# Patient Record
Sex: FEMALE | Race: BLACK OR AFRICAN AMERICAN | NOT HISPANIC OR LATINO | URBAN - NONMETROPOLITAN AREA
[De-identification: names, ages, dates, MRNs, and addresses within clinical notes are randomized per-mention and may not be internally consistent; named-entity substitution may affect disease eponyms.]

---

## 2017-12-28 NOTE — PROCEDURE NOTE: CLINICAL
PROCEDURE NOTE: Punctal Plugs, Silicone Anesthesia: Topical. Prep: Antibiotic Drops q 5min x 3. Prior to treatment, the risks/benefits/alternatives were discussed. The patient wished to proceed with procedure. Permanent silicone plugs were inserted. Patient tolerated procedure well. There were no complications. Post procedure instructions given. Oasis softplug 0.8 mm.

## 2018-04-04 NOTE — PATIENT DISCUSSION
Discussed importance of compliance with ocular meds and follow up exams to prevent loss of vision. PULMONARY Medicine Consult HISTORY AND PHYSICAL    aTTENDING Physician    Conor Bustos, *  Reason for consultation is sleep apnea    HISTORY OF PRESENT ILLNESS    Gissel Samayoa is a 53 year old female who is being evaluated for bariatric surgery. Sleep evaluation is recommended based on her body habitus. The patient has no with partner she lives by herself.    The other observers have reported snoring, no: daytime sleepiness, yes ; apneas, no; choking or gasping respirations, yes . Patient reports morning dry throat for the past > 1 year.     Patient reports falling asleep while:  sitting and reading - 3   sitting and talking to someone - 0  sitting quietly after lunch, no alcohol - 3  in a car when stopped in traffic for a few minutes - 0  watching TV - 3   as a passenger in a car - 0  lying down to rest in the afternoon - 3  at a Ready, play, or Di[employer] service - 2  Patient goes to bed 6-7 pm and up 3 am  Depression no, anxiety yes.  Caffeine seldom. ETOH no  Trouble memory yes. Concentration yes.  She falls asleep in no time. She is up night 3-4 times to BR or ??  Famh x EDUARDO no.  Restless legs no.  Heart burn yes no meds  Sinus congestion no.      Current Outpatient Prescriptions   Medication Sig Dispense Refill   • warfarin (COUMADIN) 5 MG tablet Take, by mouth, one to two tablets daily as directed.  BLOOD THINNER 60 tablet 4   • lidocaine (XYLOCAINE) 5 % ointment Apply topically as needed for Pain. 35.44 g 2   • warfarin (COUMADIN) 1 MG tablet TAKE 2-3 TABLETS BY MOUTH DAILY 270 tablet 2   • warfarin (COUMADIN) 5 MG tablet TAKE 1 TABLET BY MOUTH DAILY OR AS DIRECTED 90 tablet 1   • cyanocobalamin 1000 MCG tablet Take 1 tablet by mouth daily. For low B12 level 30 tablet 11     No current facility-administered medications for this visit.          Current Outpatient Prescriptions   Medication Sig   • warfarin (COUMADIN) 5 MG tablet Take, by mouth, one to two tablets daily as directed.  BLOOD THINNER   •  lidocaine (XYLOCAINE) 5 % ointment Apply topically as needed for Pain.   • warfarin (COUMADIN) 1 MG tablet TAKE 2-3 TABLETS BY MOUTH DAILY   • warfarin (COUMADIN) 5 MG tablet TAKE 1 TABLET BY MOUTH DAILY OR AS DIRECTED   • cyanocobalamin 1000 MCG tablet Take 1 tablet by mouth daily. For low B12 level     No current facility-administered medications for this visit.          Past Medical History:   Diagnosis Date   • Allergic rhinitis, cause unspecified    • Blood clot associated with vein wall inflammation    • Degenerative skin disorder     necrobiosis lipoidica diabet   • Diaphragmatic hernia without mention of obstruction or gangrene    • DJD (degenerative joint disease)     bilateral hips; lumbar spine   • Esophageal reflux    • Inferior mesenteric vein thrombosis (CMS/HCC) 2014   • Liver disorder     liver mass/lesions, hemangiomas   • Mild intermittent asthma without complication 2016   • Morbid obesity with BMI of 50.0-59.9, adult (CMS/HCC) 2016   • PONV (postoperative nausea and vomiting)           Past Surgical History:   Procedure Laterality Date   • BIOPSY OF BREAST, INCISIONAL  age 15 & 20y    benign tumors removed from both breasts   •  DELIVERY+POSTPARTUM CARE       x3, , ,    • COLONOSCOPY DIAGNOSTIC  2015    Diverticulosis - Dr Betts   • ESOPHAGOGASTRODUODENOSCOPY TRANSORAL FLEX W/BX SINGLE OR MULT  2005    DR Betts   • ESOPHAGOGASTRODUODENOSCOPY TRANSORAL FLEX W/BX SINGLE OR MULT  6/3/2015    [facility]   • EXCISE BREAST LES W XRAY MARKER  05    Dr. Morrissey left breast: benign breast with fibroadenoma x 2.   • EXCISE BREAST LES W XRAY MARKER  3/21/07    Ghanshyam Right breast - benign   • LAPAROSCOPY,TUBAL CAUTERY      Tubal Ligation   • REMOVAL OF TONSILS,12+ Y/O  1982   • TOTAL ABDOM HYSTERECTOMY  05    Uterine fibroids; partial 2003         Family History   Problem Relation Age of Onset   • Thyroid Daughter      Graves   • Diabetes  Mother    • Hypertension Mother      overweight   • Diabetes Father      doesn't know father well   • Genetic Sister      congenital heart defect   • OTHER Sister      B12 deficiency   • Diabetes Sister    • Asthma Sister    • OTHER Sister      B12 deficiency   • NEGATIVE FAMILY HX OF Other      No breast, ovarian, colon, or uterine cancer   • Diabetes Other      5 aunts   • OTHER Brother      B12 Deficiency   • Diabetes Brother    • OTHER Brother      B12 Deficiency   • Cancer Maternal Uncle 65     colon          Social History     Social History   • Marital status:      Spouse name: N/A   • Number of children: 3   • Years of education: N/A     Occupational History   • 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, sister, and a client     Social History Main Topics   • Smoking status: Never Smoker   • Smokeless tobacco: Never Used   • Alcohol use No   • Drug use: No   • Sexual activity: Not Currently     Other Topics Concern   •  Service No   • Blood Transfusions No   • Caffeine Concern No   • Occupational Exposure No   • Hobby Hazards No   • Sleep Concern No   • Stress Concern No   • Weight Concern Yes     wants to lose some weight   • Special Diet No   • Back Care No   • Exercise Yes     gym, cardio, weights   • Seat Belt Yes     wears seat belts   • Self-Exams Yes     doesn't do her breast exams consistently     Social History Narrative   • None          ALLERGIES:   Allergen Reactions   • Latex      rash, itching, tongue swelling         REVIEW OF SYSTEMS    Constitutional:  Denies fevers, chills, weakness, fatigue, loss of appetite, abnormal weight gain or abnormal weight loss.    Eyes:  Denies blindness, blurred vision, double vision, pain, itching, or burning.    HENT:  Denies facial pain, ear pain, hearing loss, tinnitus, nasal congestion, rhinorrhea, epistaxis, sinus pain, mouth lesions or sore throat.    Respiratory:  Denies SOB (shortness of breath), cough, sputum production, hemoptysis or wheezing.     Cardiovascular:  Denies chest pains, palpitations, tachycardia, edema, cyanosis, or vertigo.    Gastrointestinal:  Denies abdominal pain, heartburn, nausea, vomiting, diarrhea, constipation or blood in stool.    Musculoskeletal:  Denies neck pain, back pain, joint pain, joint swelling or tenderness, muscle pains or spasms. Denies neck pain, stiffness or swelling.    Neurologic:  Denies numbness, tingling, other sensory changes, sudden weakness in arms or legs. Denies confusion, headache, dizziness, memory loss or tremors.    Genitourinary:  Denies urinary frequency, nocturia, urgency, incontinence, dysuria or hematuria.    Hematologic/Lymphatic:  Denies easy bruising or bleeding or swollen lymph glands.    Endocrine:  Denies heat or cold intolerance, polydipsia or polyuria. Denies changes in hair or skin texture.    Integument:  Denies new rashes or lesions, pruritus or dryness of skin.    Psychiatric:  Denies anxiety, depression, hallucinations, irritability or sleeping problems.    Allergic/Immunologic:  Denies recurrent infections or hypersensitivity.     All other Review of Systems negative.      ================================================================    PHYSICAL EXAM     Vital Last Value 24 Hour Range   Temperature   @FLOWSTAT(6:24::1)@   Pulse 88 (08/01/17 0933) @FLOWSTAT(8:24::1)@   Respiratory   @FLOWSTAT(9:24::1)@   Blood Pressure 126/80 (08/01/17 0933) @FLOWSTAT(5:24::1)@   Pulse Oximetry 97 % (08/01/17 0933) @FLOWSTAT(10:24::1)@   Estimated body mass index is 57.86 kg/m² as calculated from the following:    Height as of 7/20/17: 5' 6\" (1.676 m).    Weight as of this encounter: 162.6 kg.   FACE and NECK: with moderate excessive soft tissue.   MOUTH: Dental occlusion normal. Tongue is enlarged for oral cavity with posterior mounding and appears markedly obstructive.    Palate is enlarged without tonsillar hypertrophy. The palatal margin is not visible behind the tongue base and appears  obstructive. NOSE: The patient breathes through her nose with alar collapse. NECK: circumference is 43 cm.    General: Alert and oriented x3, not in distress  HEENT/Neck: No pallor, no icterus, no injection, PERRLA (pupils equal, round, and reactive to light and accommodation), EOMI (extraocular movements are intact), no JVD (jugular venous distention), no palpable lymph nodes, No thyromegaly. Mucous membranes moist.  Heart: Normal S1, S2, RRR (regular rate and rhythm), no gallop, murmur, or rub  Lungs: CTA  Abdomen: Not distended, bowel sounds positive, no palpable masses, no hepatosplenomegaly  Extremities: No lower extremity swelling or tenderness, no erythema  Neurologic: No focal neurological deficit  Skin: No rashes      Assessment/plan:    ASSESSMENT: (G47.19) Daytime hypersomnolence  (primary encounter diagnosis)  Comment: History and exam are strongly suggestive of obstructive sleep apnea. Split-night sleep study is ordered. We discussed what to expect in the sleep lab.  Plan: POLYSOMNOGRAPHY 4 OR MORE PARAMETERS          (J45.20) Mild intermittent asthma without complication  Comment: Continue albuterol as needed.    (I80.9) Blood clot associated with vein wall inflammation  Comment: This is of the inferior mesenteric vein.    (Z79.01) Long term (current) use of anticoagulants    (E66.01,  Z68.43) Morbid obesity with BMI of 50.0-59.9, adult (CMS/[facility])  Comment: Being evaluated for bariatric surgery.    I will call the patient after sleep study with further recommendations    Thank you very much for this consultation      Kia Gallardo MD  Pulmonary & Critical Care Medicine  Pager: [phone]

## 2018-04-24 NOTE — PROCEDURE NOTE: CLINICAL
PROCEDURE NOTE: Punctal Plugs, Silicone #1 Right Lower Lid. Diagnosis: FARZAD. Prior to treatment, the risks/benefits/alternatives were discussed. The patient wished to proceed with procedure. Permanent silicone plugs were inserted. Size/location of plugs inserted: 0.8mm. Patient tolerated procedure well. There were no complications. Post procedure instructions given. Ashley Saldaña

## 2018-05-24 NOTE — PROCEDURE NOTE: SURGICAL
Prior to commencing surgery patient identification, surgical procedure, site, and side were confirmed by Dr. Kaylee Salcedo. Following topical proparacaine anesthesia, the patient was positioned at the YAG laser, a contact lens coupled to the cornea with methylcellulose and an axial posterior capsulotomy performed without complication using 2.6 Mj x 26. Excess methylcellulose was washed from the eye, one drop of Alphagan was instilled and the patient returned to the holding area having tolerated the procedure well and without complication. <br />[facility]:[id number]

## 2018-05-31 NOTE — PROCEDURE NOTE: SURGICAL
Prior to commencing surgery patient identification, surgical procedure, site, and side were confirmed by Dr. Aftab Vick. Following topical proparacaine anesthesia, the patient was positioned at the YAG laser, a contact lens coupled to the cornea with methylcellulose and an axial posterior capsulotomy performed without complication using 3.2 Mj x 15. Excess methylcellulose was washed from the eye, one drop of Alphagan was instilled and the patient returned to the holding area having tolerated the procedure well and without complication. <br />[facility]:[id number]

## 2018-06-07 NOTE — PROCEDURE NOTE: CLINICAL
PROCEDURE NOTE: Punctal Plugs, Joelle Maldonado ([id number], [id number]) #2 OU. Diagnosis: FARZAD. Inserted Quintess Dissolvable PP BLL:  0.4 mm x 2 BLL. No complications. Wanda Linton

## 2019-01-07 NOTE — PATIENT DISCUSSION
Patient informed todays visit will be billed to medical insurance due to inserting punctal plugs.   She was instructed to rtc for comp to use her vision plan.

## 2019-01-07 NOTE — PATIENT DISCUSSION
Patient elects Charly Villalba PP BLL today.  No complications OD.  Trace bleeding OS LL.  Rx polytrim qid os for 1 week.

## 2019-01-07 NOTE — PROCEDURE NOTE: CLINICAL
PROCEDURE NOTE: Punctal Plugs, Lorri Gotti ([id number], [id number]) #2 OU. Diagnosis: Keratoconjunctivitis Sicca, Not Specified As Sjögren's. Inserted Samanthatess PP BLL. No complications OD. Slight residual blood OS. Rx polytrim qid os for 1 week. Julio C Murrieta

## 2019-02-19 ENCOUNTER — IMPORTED ENCOUNTER (OUTPATIENT)
Dept: URBAN - NONMETROPOLITAN AREA CLINIC 1 | Facility: CLINIC | Age: 54
End: 2019-02-19

## 2019-02-19 PROBLEM — H53.121: Noted: 2019-02-19

## 2019-02-19 PROCEDURE — 99202 OFFICE O/P NEW SF 15 MIN: CPT

## 2019-02-19 NOTE — PATIENT DISCUSSION
TRANSIENT VISION LOSS ODDUE TO UNCONTROLLED BSEDUCATE PTREASSURED PT THAT VISION WILL RETURN WITH BS CONTROLRTC PRN; 's Notes: Please dilate with 1% only if Dilation is indicated.  Pt stays dilated for DAYS

## 2019-04-30 NOTE — PROCEDURE NOTE: CLINICAL
PROCEDURE NOTE: Punctal Plugs, Silicone OD. Diagnosis: Keratoconjunctivitis Sicca, Not Specified As Sjögren's. Prior to treatment, the risks/benefits/alternatives were discussed. The patient wished to proceed with procedure. Permanent silicone plugs were inserted. Size/location of plugs inserted: RLL 1.0MM. Patient tolerated procedure well. There were no complications. Post procedure instructions given. Ben Chang

## 2019-05-02 NOTE — PATIENT DISCUSSION
5/2/19.  Plug RLL removed by technician in office, immediately followed by release of mucoid discharge.  start Azasite BID OD until gone and also Rx Polytrim QID OD for one week (pt leave tomorrow for [place] for 2 weeks).   I suspect may have older silicone plug intracan RLL and may need canaliculotomy RLL with [facility] if a recurrent issue.

## 2019-06-03 NOTE — PATIENT DISCUSSION
SUBJECTIVE:     CC: Charity Hudson is an 46 Discussed lid hygiene, warm compress and eyelid wash. Provider  Allergies  Meds  Problems        Past Medical History   Diagnosis Date     NO ACTIVE PROBLEMS       Past Surgical History   Procedure Laterality Date      section  2000     1st pregnancy FTP, 2nd pregnancy - Rosy Ayala MD  [facility]

## 2019-06-03 NOTE — PROCEDURE NOTE: CLINICAL
PROCEDURE NOTE: Punctal Plugs, Joelle Maldonado ([id number], [id number]) OU. Diagnosis: Keratoconjunctivitis Sicca, Not Specified As Sjögren's. Inserted Quintess Dissolvable PP BLL 0.5 mm. No complications. Wanda Linton

## 2021-01-19 NOTE — PATIENT DISCUSSION
refer to [facility] for astigmatic SCL OD trial, possible LVC if appreciates SCL-if not JTM will eval ERM again at next visit.  Pt to wear scl to [facility] visit for bcva check.

## 2021-01-19 NOTE — PATIENT DISCUSSION
ALL DISCUSSION PER LAST VISIT WITH [facility]-- for refraction after JTM consult--SEE #1.

## 2021-01-19 NOTE — PATIENT DISCUSSION
Before rec surgical intervention would rec pt try astigmatic contact lens to see if change is more refractive with possible referral for LVC.  Pt to come into next visit with JTM wearing trial astigmatic SCL.

## 2021-01-19 NOTE — PATIENT DISCUSSION
ALL DISCUSSION PER LAST VISIT WITH [facility]--Patient educated on decrease reliability of SO and increase probability of LVC enh.

## 2021-02-15 NOTE — PATIENT DISCUSSION
ALL DISCUSSION PER LAST VISIT WITH [facility]-- for refraction after JTM consult--SEE #1.

## 2021-02-15 NOTE — PATIENT DISCUSSION
ALL DISCUSSION PER LAST VISIT WITH [facility]--Recommended warm compresses, lid scrubs.

## 2021-02-25 NOTE — PATIENT DISCUSSION
ALL DISCUSSION PER LAST VISIT WITH [facility]--Recommended warm compresses, lid scrubs.

## 2021-02-25 NOTE — PATIENT DISCUSSION
ALL DISCUSSION PER LAST VISIT WITH [facility]-- for refraction after JTM Consult. Yes

## 2021-02-25 NOTE — PATIENT DISCUSSION
Patient does not want to wear and deal with contact lenses even though vision improves with contact. Patient to return contacts for refund.

## 2021-03-02 NOTE — PATIENT DISCUSSION
The patient has mild cornea guttata, or early Fuch's endothelial dystrophy. Specular microscopy documented the cell count and morphology. The possibility that the condition could progress was explained. At this early stage observation and serial specular microscopy are indicated. specs.

## 2021-03-02 NOTE — PATIENT DISCUSSION
"Explained refractive LVC sx. Very small Rx for LVC with minimum improvement. Rec get ""sports "" DV gls for driving  and play sports for better VA and sun protection. Gls RX given. "

## 2021-03-02 NOTE — PATIENT DISCUSSION
ALL DISCUSSION PER LAST VISIT WITH [facility]--Recommended warm compresses, lid scrubs.

## 2021-03-30 NOTE — PATIENT DISCUSSION
ALL DISCUSSION PER LAST VISIT WITH [facility]-- for refraction after JTM consult--SEE #1.

## 2021-03-30 NOTE — PATIENT DISCUSSION
Recommended against surgery at this time given that patient is happy with present vision. Also same as #1.

## 2021-03-30 NOTE — PATIENT DISCUSSION
ALL DISCUSSION PER LAST VISIT WITH [facility]--Recommended warm compresses, lid scrubs.

## 2021-03-30 NOTE — PATIENT DISCUSSION
ALL DISCUSSION PER LAST VISIT WITH [facility]-- for refraction after JTM Consult.

## 2021-03-30 NOTE — PATIENT DISCUSSION
Membrane is not visually significant. Vision improved with correction to 20/30. Risks out weigh benefits at this time. This is not a results of cataract surgery. 80 % usually remain stable and never require surgery. Vision is too good to consider surgery at this time. Dry AMD will also limit BCVA. Did offer pt a second opinion, pt declines. Will follow up in 1 year. If worsens and vision worsens can then consider intervention.

## 2021-04-22 NOTE — PATIENT DISCUSSION
ALL DISCUSSION PER LAST VISIT WITH [facility]--Recommended warm compresses, lid scrubs.

## 2021-04-22 NOTE — PATIENT DISCUSSION
ALL DISCUSSION PER LAST VISIT WITH [facility]-- for refraction after JTM Consult.

## 2021-05-22 NOTE — PATIENT DISCUSSION
Retinal tear and detachment warning symptoms reviewed and patient instructed to call immediately if increasing floaters, flashes, or decreasing peripheral vision. MSK pain/Other specify

## 2022-04-10 ASSESSMENT — TONOMETRY
OS_IOP_MMHG: 20
OD_IOP_MMHG: 20

## 2022-04-10 ASSESSMENT — VISUAL ACUITY: OD_SC: 20/400

## 2022-04-12 NOTE — PATIENT DISCUSSION
The patient has mild cornea guttata, or early Fuch's endothelial dystrophy. Specular microscopy documented the cell count and morphology. The possibility that the condition could progress was explained. At this early stage observation and serial specular microscopy are indicated. specs. No

## 2022-10-05 NOTE — PROCEDURE NOTE: CLINICAL
PROCEDURE NOTE: Punctal Plugs, Corneliusrenu  ([id number], [id number]) OU. Diagnosis: Keratoconjunctivitis Sicca, Not Specified As Sjögren's. Prior to treatment, the risks/benefits/alternatives were discussed. The patient wished to proceed with procedure. Temporary collagen plugs were inserted. Patient tolerated procedure well. There were no complications. Post procedure instructions given. Julio C Murrieta

## 2023-09-08 NOTE — PATIENT DISCUSSION
Discussed lid hygiene, warm compress and eyelid wash. [FreeTextEntry1] : Discussed [facility] recommendation that pt no longer needs PAP

## 2024-07-03 NOTE — PATIENT DISCUSSION
Advised to call immediately if any worsening distortion or blurring is noted. 

## 2025-04-07 NOTE — PATIENT DISCUSSION
64 units of botox.
BLL bleph trans conj laser.
Blepharoplasty Recommended.
Continue ointment qhs.
Continue topical antibiotic qhs.
Discussed condition and exacerbating conditions/situations (e.g., dry/arid environments, overhead fans, air conditioners, side effect of medications).
Discussed importance of compliance with ocular meds and follow up exams to prevent loss of vision.
Discussed lid hygiene, warm compress and eyelid wash.
Good postoperative appearance.
Healing well.
Instructed to call immediately if any new distortion, blurring, decreased vision or eye pain.
Lids in good position.
Mini lower lift, + platymaplasty, postragel, SMAS to NLF/ML/cheeks.
Monitor.
No Retinal holes, Tears or Detachments.
Patient educated on decrease reliability of SO and increase probability of LVC enh.
Patient elects Joelle JACKSONL today.  No complications.
Patient happy with [facility].
Patient understands condition, prognosis and need for follow up care.
Reassured good vision at distance and near ou.
Recommended artificial tears to use: 1 drop 4x a day in both eyes.
Removed PP LLL with forceps.  No complications.
Retinal tear and detachment warning symptoms reviewed and patient instructed to call immediately if increasing floaters, flashes, or decreasing peripheral vision.
Rx vigamox qid os for 1 week.
Sente or obagi.
Stable.
Sutures intact.
Sutures removed without difficulty or complications. 4/11/18.
The patient is status post Yag laser Capsulotomy in the left eye. The vision shows nice improvement.
The patient is status post Yag laser Capsulotomy in the right eye. The vision shows nice improvement.
This visual field clearly demonstrated a minimum of 48% loss of upper field of vision OU, with upper lid skin in repose and elevated by taping of the lid to demonstrate potential correction. This field shows that taping the lids significantly improved this patient's superior field of vision by approximately 48%, OU.
Use drops as directed.
may increase chance of k-edema post sx.
volluma x 2 (vobella in thin lips).
no